# Patient Record
(demographics unavailable — no encounter records)

---

## 2025-01-30 NOTE — PHYSICAL EXAM
[General Appearance - Well Developed] : well developed [Normal Appearance] : normal appearance [Well Groomed] : well groomed [General Appearance - Well Nourished] : well nourished [No Deformities] : no deformities [General Appearance - In No Acute Distress] : no acute distress [Normal Conjunctiva] : the conjunctiva exhibited no abnormalities [Eyelids - No Xanthelasma] : the eyelids demonstrated no xanthelasmas [Normal Oral Mucosa] : normal oral mucosa [No Oral Pallor] : no oral pallor [No Oral Cyanosis] : no oral cyanosis [Normal Jugular Venous A Waves Present] : normal jugular venous A waves present [Normal Jugular Venous V Waves Present] : normal jugular venous V waves present [No Jugular Venous Machado A Waves] : no jugular venous machado A waves [Respiration, Rhythm And Depth] : normal respiratory rhythm and effort [Exaggerated Use Of Accessory Muscles For Inspiration] : no accessory muscle use [Auscultation Breath Sounds / Voice Sounds] : lungs were clear to auscultation bilaterally [Heart Rate And Rhythm] : heart rate and rhythm were normal [Heart Sounds] : normal S1 and S2 [Murmurs] : no murmurs present [Abdomen Soft] : soft [Abdomen Tenderness] : non-tender [Abdomen Mass (___ Cm)] : no abdominal mass palpated [Abnormal Walk] : normal gait [Gait - Sufficient For Exercise Testing] : the gait was sufficient for exercise testing [Nail Clubbing] : no clubbing of the fingernails [Cyanosis, Localized] : no localized cyanosis [Petechial Hemorrhages (___cm)] : no petechial hemorrhages [Skin Color & Pigmentation] : normal skin color and pigmentation [] : no rash [No Venous Stasis] : no venous stasis [Skin Lesions] : no skin lesions [No Skin Ulcers] : no skin ulcer [No Xanthoma] : no  xanthoma was observed [Oriented To Time, Place, And Person] : oriented to person, place, and time [Affect] : the affect was normal [Mood] : the mood was normal [No Anxiety] : not feeling anxious

## 2025-01-30 NOTE — PHYSICAL EXAM
[General Appearance - Well Developed] : well developed [Normal Appearance] : normal appearance [Well Groomed] : well groomed [General Appearance - Well Nourished] : well nourished [No Deformities] : no deformities [General Appearance - In No Acute Distress] : no acute distress [Normal Conjunctiva] : the conjunctiva exhibited no abnormalities [Eyelids - No Xanthelasma] : the eyelids demonstrated no xanthelasmas [Normal Oral Mucosa] : normal oral mucosa [No Oral Pallor] : no oral pallor [No Oral Cyanosis] : no oral cyanosis [Normal Jugular Venous A Waves Present] : normal jugular venous A waves present [Normal Jugular Venous V Waves Present] : normal jugular venous V waves present [No Jugular Venous Machado A Waves] : no jugular venous machado A waves [Respiration, Rhythm And Depth] : normal respiratory rhythm and effort [Exaggerated Use Of Accessory Muscles For Inspiration] : no accessory muscle use [Auscultation Breath Sounds / Voice Sounds] : lungs were clear to auscultation bilaterally [Heart Rate And Rhythm] : heart rate and rhythm were normal [Heart Sounds] : normal S1 and S2 [Murmurs] : no murmurs present [Abdomen Soft] : soft [Abdomen Tenderness] : non-tender [Abdomen Mass (___ Cm)] : no abdominal mass palpated [Abnormal Walk] : normal gait [Gait - Sufficient For Exercise Testing] : the gait was sufficient for exercise testing [Nail Clubbing] : no clubbing of the fingernails [Cyanosis, Localized] : no localized cyanosis [Petechial Hemorrhages (___cm)] : no petechial hemorrhages [Skin Color & Pigmentation] : normal skin color and pigmentation [] : no rash [No Venous Stasis] : no venous stasis [Skin Lesions] : no skin lesions [No Skin Ulcers] : no skin ulcer [No Xanthoma] : no  xanthoma was observed [Oriented To Time, Place, And Person] : oriented to person, place, and time [Mood] : the mood was normal [Affect] : the affect was normal [No Anxiety] : not feeling anxious

## 2025-02-02 NOTE — HISTORY OF PRESENT ILLNESS
[FreeTextEntry1] : 1 1/2 months ago the patient had left foot numbness and headache in the back of her head. She was brought to [facility] emergency room with a due to workup including a CAT scan and MRI and MRA. They found no evidence of a CVA. They found no mass effect. They observed her for 24 hours and they sent her home. He did have an incidental finding of enlarged thyroid. She comes for followup. No new medications were prescribed. 2019: The patient has been experiencing chest and neck tightness for the past week. They come and go all day long. They are unrelated to exercise. They can last seconds to minutes at a time. She sometimes gets some epigastric and substernal discomfort. She denies any outright chest pains, shortness of breath, or palpitations. 2019: The patient has pains in her neck.  She also has pains in her right great toe.  She denies any chest pains, shortness of breath, or palpitations. 2020: Patient has pains in her abdomen that are fleeting and change sides and are intermittent.  She has pains in her infrascapular area.  She has pains in her neck also.  She is under stress because her granddaughter who lives in [place] is sick.  She denies any chest pains, shortness of breath, or palpitations.  She is in need of a routine mammogram.  She has a thyroid nodule which her endocrinologist recommends biopsy but she has not done that yet.  When she went to [facility] radiology they could not find it. 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  She is feeling better but the day after she had it placed she had vertigo and was treated with meclizine.  She denies any chest pains, shortness of breath, or palpitations. 2020: Patient gets occasional neck tightness that last 2 to 5 minutes and is 2 out of 10 in intensity.  It is not related to exercise.  She also gets pains in the back of her neck when she is under stress.  None of these symptoms are exercise related.  She denies any shortness of breath or palpitations. 2021: The patient still gets occasional neck pains but they last just seconds at a time and then not related to exercise.  She denies any chest pains, shortness of breath, or palpitations.  She has a problem with bowel movements and that she has urgency and she does not seem to complete at the first bowel movement.  The second bowel movement slightly loose.  I advised her to follow-up with Dr. Ron, a gastroenterologist.  I also advised her to try Lactaid milk instead of regular milk. May 18, 2021: Patient has no new complaints.  She is taking dicyclomine every other day and it has helped her diarrhea.  When she took it every day she got constipated.  She saw her doctor Asaf about this.  She denies any chest pains, shortness of breath, or palpitations. 2021: The patient has no new complaints.  She denies any palpitations, shortness of breath, or chest pains. 2022: The patient gets occasional tightness or a lump type feeling at the top of her sternum.  It only last for 2 minutes or so and is not related to any specific activity.  She denies any chest pains other than that.  She denies any shortness of breath or palpitations. May 17, 2022: The patient had a brother who  this past month after suffering from colon cancer stage IV for 6 years.  She also had a close cousin  of ovarian cancer.  She denies any chest pains, palpitations, or shortness of breath. 2022: The patient has several scattered intermittent pains including lower back, epigastric, and lower abdominal.  Dr. Ron  is sending her for CAT scan.  She had a brother who  of colon cancer and a cousin who  of ovarian cancer.  She denies any chest pains, shortness of breath, or palpitations. 2023: The patient has no new complaints.  She denies any shortness of breath, palpitations, or chest pains. May 23, 2023: The patient has no new complaints.  She denies any chest pains, shortness of breath, or palpitations.  She does get a tension headache once in a while. 2023 - Patient returns today for follow-up for the first time since Eyad Jimenez MD retired.  2024 Rachele Schilling returns today for scheduled follow up. She is seen today in her usual state of health. She denies any chest discomfort, shortness of breath, palpitations, lightheadedness or syncope. We have reviewed her medications and she is taking all as directed.   She and her  will be celebrating their 64-year wedding anniversary next month, 7/3.   2024 Rachele Schilling returns today for routine scheduled follow up. She is seen today in her usual state of health. At a recent visit with her internist she was found to have a urinary tract infection and is currently on antibiotic therapy. She is not exercising aside from going up and down the stairs in the house. No chest discomfort, shortness of breath, palpitations, lightheadedness or syncope.

## 2025-02-02 NOTE — CARDIOLOGY SUMMARY
[de-identified] : 6/4/2024. SR at 73 BPM. Left axis-anterior fascicular block. Poor R-wave progression. Low voltage in precordial leads.  [de-identified] : 9/3/2019 - 4 minutes and 30 seconds of Nicholas protocol (5 METS), 93% MPHR, normal myocardial perfusion imaging, LVEF 85%, LVEDV 41 mL [de-identified] : 9/3/2019 - dilated LA, normal LV sysotlic function, LVEF 53%

## 2025-02-02 NOTE — CARDIOLOGY SUMMARY
[de-identified] : 6/4/2024. SR at 73 BPM. Left axis-anterior fascicular block. Poor R-wave progression. Low voltage in precordial leads.  [de-identified] : 9/3/2019 - 4 minutes and 30 seconds of Nicholas protocol (5 METS), 93% MPHR, normal myocardial perfusion imaging, LVEF 85%, LVEDV 41 mL [de-identified] : 9/3/2019 - dilated LA, normal LV sysotlic function, LVEF 53%

## 2025-02-02 NOTE — CARDIOLOGY SUMMARY
[de-identified] : 6/4/2024. SR at 73 BPM. Left axis-anterior fascicular block. Poor R-wave progression. Low voltage in precordial leads.  [de-identified] : 9/3/2019 - 4 minutes and 30 seconds of Nicholas protocol (5 METS), 93% MPHR, normal myocardial perfusion imaging, LVEF 85%, LVEDV 41 mL [de-identified] : 9/3/2019 - dilated LA, normal LV sysotlic function, LVEF 53%

## 2025-02-02 NOTE — HISTORY OF PRESENT ILLNESS
[FreeTextEntry1] : 1 1/2 months ago the patient had left foot numbness and headache in the back of her head. She was brought to [facility] emergency room with a due to workup including a CAT scan and MRI and MRA. They found no evidence of a CVA. They found no mass effect. They observed her for 24 hours and they sent her home. He did have an incidental finding of enlarged thyroid. She comes for followup. No new medications were prescribed. 2019: The patient has been experiencing chest and neck tightness for the past week. They come and go all day long. They are unrelated to exercise. They can last seconds to minutes at a time. She sometimes gets some epigastric and substernal discomfort. She denies any outright chest pains, shortness of breath, or palpitations. 2019: The patient has pains in her neck.  She also has pains in her right great toe.  She denies any chest pains, shortness of breath, or palpitations. 2020: Patient has pains in her abdomen that are fleeting and change sides and are intermittent.  She has pains in her infrascapular area.  She has pains in her neck also.  She is under stress because her granddaughter who lives in [place] is sick.  She denies any chest pains, shortness of breath, or palpitations.  She is in need of a routine mammogram.  She has a thyroid nodule which her endocrinologist recommends biopsy but she has not done that yet.  When she went to [facility] radiology they could not find it. 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  She is feeling better but the day after she had it placed she had vertigo and was treated with meclizine.  She denies any chest pains, shortness of breath, or palpitations. 2020: Patient gets occasional neck tightness that last 2 to 5 minutes and is 2 out of 10 in intensity.  It is not related to exercise.  She also gets pains in the back of her neck when she is under stress.  None of these symptoms are exercise related.  She denies any shortness of breath or palpitations. 2021: The patient still gets occasional neck pains but they last just seconds at a time and then not related to exercise.  She denies any chest pains, shortness of breath, or palpitations.  She has a problem with bowel movements and that she has urgency and she does not seem to complete at the first bowel movement.  The second bowel movement slightly loose.  I advised her to follow-up with Dr. Ron, a gastroenterologist.  I also advised her to try Lactaid milk instead of regular milk. May 18, 2021: Patient has no new complaints.  She is taking dicyclomine every other day and it has helped her diarrhea.  When she took it every day she got constipated.  She saw her doctor Asaf about this.  She denies any chest pains, shortness of breath, or palpitations. 2021: The patient has no new complaints.  She denies any palpitations, shortness of breath, or chest pains. 2022: The patient gets occasional tightness or a lump type feeling at the top of her sternum.  It only last for 2 minutes or so and is not related to any specific activity.  She denies any chest pains other than that.  She denies any shortness of breath or palpitations. May 17, 2022: The patient had a brother who  this past month after suffering from colon cancer stage IV for 6 years.  She also had a close cousin  of ovarian cancer.  She denies any chest pains, palpitations, or shortness of breath. 2022: The patient has several scattered intermittent pains including lower back, epigastric, and lower abdominal.  Dr. Ron  is sending her for CAT scan.  She had a brother who  of colon cancer and a cousin who  of ovarian cancer.  She denies any chest pains, shortness of breath, or palpitations. 2023: The patient has no new complaints.  She denies any shortness of breath, palpitations, or chest pains. May 23, 2023: The patient has no new complaints.  She denies any chest pains, shortness of breath, or palpitations.  She does get a tension headache once in a while. 2023 - Patient returns today for follow-up for the first time since Eyad Jimenez MD retired.  2024 Rachele Schilling returns today for scheduled follow up. She is seen today in her usual state of health. She denies any chest discomfort, shortness of breath, palpitations, lightheadedness or syncope. We have reviewed her medications and she is taking all as directed.   She and her  will be celebrating their 64-year wedding anniversary next month, 7/3.   2024 Rachele Schilling returns today for routine scheduled follow up. She is seen today in her usual state of health. At a recent visit with her internist she was found to have a urinary tract infection and is currently on antibiotic therapy. She is not exercising aside from going up and down the stairs in the house. No chest discomfort, shortness of breath, palpitations, lightheadedness or syncope.

## 2025-02-02 NOTE — REVIEW OF SYSTEMS
[Headache] : headache [Diarrhea] : diarrhea [Constipation] : constipation [Negative] : Heme/Lymph [SOB] : no shortness of breath [Dyspnea on exertion] : not dyspnea during exertion [Chest Discomfort] : no chest discomfort [Lower Ext Edema] : no extremity edema [Leg Claudication] : no intermittent leg claudication [Palpitations] : no palpitations [Orthopnea] : no orthopnea [PND] : no PND [Syncope] : no syncope

## 2025-02-02 NOTE — REASON FOR VISIT
[Spouse] : spouse [FreeTextEntry1] : 1/30/2025 Rachele returns today for scheduled follow up. She is seen today in her usual state of health. Due to the cold weather, she has been mostly inside but is still able to complete her food shopping and run errands as needed. She does note an occasional tightness across her chest which occurs at random, but otherwise no chest discomfort, shortness of breath, palpitations, lightheadedness or syncope. She is in the process of transitioning her primary care physician to Dr. Ewing for ease in commute.

## 2025-02-02 NOTE — END OF VISIT
[Time Spent: ___ minutes] : I have spent [unfilled] minutes of time on the encounter which excludes teaching and separately reported services. [FreeTextEntry3] : I, Arnold Jimenez MD, personally performed the evaluation and management (E/M) services for this established patient who presents today with (a) new problem(s)/exacerbation of (an) existing condition(s). That E/M includes conducting the clinically appropriate interval history &/or exam, assessing all new/exacerbated conditions, and establishing a new plan of care. Today, Petrona Raygoza NP was here to observe my evaluation and management service for this new problem/exacerbated condition and follow the plan of care established by me going forward.

## 2025-02-02 NOTE — HISTORY OF PRESENT ILLNESS
[FreeTextEntry1] : 1 1/2 months ago the patient had left foot numbness and headache in the back of her head. She was brought to [facility] emergency room with a due to workup including a CAT scan and MRI and MRA. They found no evidence of a CVA. They found no mass effect. They observed her for 24 hours and they sent her home. He did have an incidental finding of enlarged thyroid. She comes for followup. No new medications were prescribed. 2019: The patient has been experiencing chest and neck tightness for the past week. They come and go all day long. They are unrelated to exercise. They can last seconds to minutes at a time. She sometimes gets some epigastric and substernal discomfort. She denies any outright chest pains, shortness of breath, or palpitations. 2019: The patient has pains in her neck.  She also has pains in her right great toe.  She denies any chest pains, shortness of breath, or palpitations. 2020: Patient has pains in her abdomen that are fleeting and change sides and are intermittent.  She has pains in her infrascapular area.  She has pains in her neck also.  She is under stress because her granddaughter who lives in [place] is sick.  She denies any chest pains, shortness of breath, or palpitations.  She is in need of a routine mammogram.  She has a thyroid nodule which her endocrinologist recommends biopsy but she has not done that yet.  When she went to [facility] radiology they could not find it. 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  She is feeling better but the day after she had it placed she had vertigo and was treated with meclizine.  She denies any chest pains, shortness of breath, or palpitations. 2020: Patient gets occasional neck tightness that last 2 to 5 minutes and is 2 out of 10 in intensity.  It is not related to exercise.  She also gets pains in the back of her neck when she is under stress.  None of these symptoms are exercise related.  She denies any shortness of breath or palpitations. 2021: The patient still gets occasional neck pains but they last just seconds at a time and then not related to exercise.  She denies any chest pains, shortness of breath, or palpitations.  She has a problem with bowel movements and that she has urgency and she does not seem to complete at the first bowel movement.  The second bowel movement slightly loose.  I advised her to follow-up with Dr. Ron, a gastroenterologist.  I also advised her to try Lactaid milk instead of regular milk. May 18, 2021: Patient has no new complaints.  She is taking dicyclomine every other day and it has helped her diarrhea.  When she took it every day she got constipated.  She saw her doctor Asaf about this.  She denies any chest pains, shortness of breath, or palpitations. 2021: The patient has no new complaints.  She denies any palpitations, shortness of breath, or chest pains. 2022: The patient gets occasional tightness or a lump type feeling at the top of her sternum.  It only last for 2 minutes or so and is not related to any specific activity.  She denies any chest pains other than that.  She denies any shortness of breath or palpitations. May 17, 2022: The patient had a brother who  this past month after suffering from colon cancer stage IV for 6 years.  She also had a close cousin  of ovarian cancer.  She denies any chest pains, palpitations, or shortness of breath. 2022: The patient has several scattered intermittent pains including lower back, epigastric, and lower abdominal.  Dr. Ron  is sending her for CAT scan.  She had a brother who  of colon cancer and a cousin who  of ovarian cancer.  She denies any chest pains, shortness of breath, or palpitations. 2023: The patient has no new complaints.  She denies any shortness of breath, palpitations, or chest pains. May 23, 2023: The patient has no new complaints.  She denies any chest pains, shortness of breath, or palpitations.  She does get a tension headache once in a while. 2023 - Patient returns today for follow-up for the first time since Eyad Jimenez MD retired.  2024 Rachele Schilling returns today for scheduled follow up. She is seen today in her usual state of health. She denies any chest discomfort, shortness of breath, palpitations, lightheadedness or syncope. We have reviewed her medications and she is taking all as directed.   She and her  will be celebrating their 64-year wedding anniversary next month, 7/3.   2024 Rachele Schilling returns today for routine scheduled follow up. She is seen today in her usual state of health. At a recent visit with her internist she was found to have a urinary tract infection and is currently on antibiotic therapy. She is not exercising aside from going up and down the stairs in the house. No chest discomfort, shortness of breath, palpitations, lightheadedness or syncope.

## 2025-02-02 NOTE — DISCUSSION/SUMMARY
[EKG obtained to assist in diagnosis and management of assessed problem(s)] : EKG obtained to assist in diagnosis and management of assessed problem(s) [FreeTextEntry1] : The patient was examined. Her blood pressure was 130/68 mm Hg mmHg, and her pulse was 76 bpm. Her neck was supple. Her lungs were clear to auscultation. Cardiac exam was negative for murmurs rubs or gallops The EKG showed normal sinus rhythm with no acute changes.  We will continue the patient's diltiazem at 420 mg. She'll continue on her other medication.  She will schedule an echocardiogram for structural heart evaluation.   She will return in 3 months, or earlier if needed.

## 2025-04-29 NOTE — CARDIOLOGY SUMMARY
[de-identified] : 6/4/2024. SR at 73 BPM. Left axis-anterior fascicular block. Poor R-wave progression. Low voltage in precordial leads.  [de-identified] : 9/3/2019 - 4 minutes and 30 seconds of Nicholas protocol (5 METS), 93% MPHR, normal myocardial perfusion imaging, LVEF 85%, LVEDV 41 mL [de-identified] : 9/3/2019 - dilated LA, normal LV systolic function, LVEF 53%. 2/3/2025- TTE.  1. Left ventricular systolic function is normal with an ejection fraction of 67 % by Paula's method of disks. 2. Left atrium is normal in size. 3. Mild mitral regurgitation at a blood pressure of 130/70 mmHg. 4. Normal right ventricular cavity size and normal right ventricular systolic function. 5. Estimated pulmonary artery systolic pressure is 30 mmHg, consistent with normal pulmonary artery pressure. 6. Compared to the transthoracic echocardiogram performed on 9/3/2019, there have been no significant interval changes.

## 2025-04-29 NOTE — REASON FOR VISIT
[Spouse] : spouse [FreeTextEntry1] : 4/22/2025 Rachele returns today for scheduled follow up. She has been getting frequent pressure type headaches on a near daily basis. Tylenol usually resolves her symptoms. She is planning to see her internist next week including labs.

## 2025-04-29 NOTE — CARDIOLOGY SUMMARY
[de-identified] : 6/4/2024. SR at 73 BPM. Left axis-anterior fascicular block. Poor R-wave progression. Low voltage in precordial leads.  [de-identified] : 9/3/2019 - 4 minutes and 30 seconds of Nicholas protocol (5 METS), 93% MPHR, normal myocardial perfusion imaging, LVEF 85%, LVEDV 41 mL [de-identified] : 9/3/2019 - dilated LA, normal LV systolic function, LVEF 53%. 2/3/2025- TTE.  1. Left ventricular systolic function is normal with an ejection fraction of 67 % by Paula's method of disks. 2. Left atrium is normal in size. 3. Mild mitral regurgitation at a blood pressure of 130/70 mmHg. 4. Normal right ventricular cavity size and normal right ventricular systolic function. 5. Estimated pulmonary artery systolic pressure is 30 mmHg, consistent with normal pulmonary artery pressure. 6. Compared to the transthoracic echocardiogram performed on 9/3/2019, there have been no significant interval changes.

## 2025-04-29 NOTE — HISTORY OF PRESENT ILLNESS
[FreeTextEntry1] : 1 1/2 months ago the patient had left foot numbness and headache in the back of her head. She was brought to [facility] emergency room with a due to workup including a CAT scan and MRI and MRA. They found no evidence of a CVA. They found no mass effect. They observed her for 24 hours and they sent her home. He did have an incidental finding of enlarged thyroid. She comes for followup. No new medications were prescribed. 2019: The patient has been experiencing chest and neck tightness for the past week. They come and go all day long. They are unrelated to exercise. They can last seconds to minutes at a time. She sometimes gets some epigastric and substernal discomfort. She denies any outright chest pains, shortness of breath, or palpitations. 2019: The patient has pains in her neck.  She also has pains in her right great toe.  She denies any chest pains, shortness of breath, or palpitations. 2020: Patient has pains in her abdomen that are fleeting and change sides and are intermittent.  She has pains in her infrascapular area.  She has pains in her neck also.  She is under stress because her granddaughter who lives in [place] is sick.  She denies any chest pains, shortness of breath, or palpitations.  She is in need of a routine mammogram.  She has a thyroid nodule which her endocrinologist recommends biopsy but she has not done that yet.  When she went to [facility] radiology they could not find it. 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  She is feeling better but the day after she had it placed she had vertigo and was treated with meclizine.  She denies any chest pains, shortness of breath, or palpitations. 2020: Patient gets occasional neck tightness that last 2 to 5 minutes and is 2 out of 10 in intensity.  It is not related to exercise.  She also gets pains in the back of her neck when she is under stress.  None of these symptoms are exercise related.  She denies any shortness of breath or palpitations. 2021: The patient still gets occasional neck pains but they last just seconds at a time and then not related to exercise.  She denies any chest pains, shortness of breath, or palpitations.  She has a problem with bowel movements and that she has urgency and she does not seem to complete at the first bowel movement.  The second bowel movement slightly loose.  I advised her to follow-up with Dr. Ron, a gastroenterologist.  I also advised her to try Lactaid milk instead of regular milk. May 18, 2021: Patient has no new complaints.  She is taking dicyclomine every other day and it has helped her diarrhea.  When she took it every day she got constipated.  She saw her doctor Asaf about this.  She denies any chest pains, shortness of breath, or palpitations. 2021: The patient has no new complaints.  She denies any palpitations, shortness of breath, or chest pains. 2022: The patient gets occasional tightness or a lump type feeling at the top of her sternum.  It only last for 2 minutes or so and is not related to any specific activity.  She denies any chest pains other than that.  She denies any shortness of breath or palpitations. May 17, 2022: The patient had a brother who  this past month after suffering from colon cancer stage IV for 6 years.  She also had a close cousin  of ovarian cancer.  She denies any chest pains, palpitations, or shortness of breath. 2022: The patient has several scattered intermittent pains including lower back, epigastric, and lower abdominal.  Dr. Ron  is sending her for CAT scan.  She had a brother who  of colon cancer and a cousin who  of ovarian cancer.  She denies any chest pains, shortness of breath, or palpitations. 2023: The patient has no new complaints.  She denies any shortness of breath, palpitations, or chest pains. May 23, 2023: The patient has no new complaints.  She denies any chest pains, shortness of breath, or palpitations.  She does get a tension headache once in a while. 2023 - Patient returns today for follow-up for the first time since Eyad Jimenez MD retired.  2024 Rachele Schilling returns today for scheduled follow up. She is seen today in her usual state of health. She denies any chest discomfort, shortness of breath, palpitations, lightheadedness or syncope. We have reviewed her medications and she is taking all as directed.   She and her  will be celebrating their 64-year wedding anniversary next month, 7/3.   2024 Rachele Schilling returns today for routine scheduled follow up. She is seen today in her usual state of health. At a recent visit with her internist she was found to have a urinary tract infection and is currently on antibiotic therapy. She is not exercising aside from going up and down the stairs in the house. No chest discomfort, shortness of breath, palpitations, lightheadedness or syncope.  2025 Rachele returns today for scheduled follow up. She is seen today in her usual state of health. Due to the cold weather, she has been mostly inside but is still able to complete her food shopping and run errands as needed. She does note an occasional tightness across her chest which occurs at random, but otherwise no chest discomfort, shortness of breath, palpitations, lightheadedness or syncope. She is in the process of transitioning her primary care physician to Dr. Ewing for ease in commute.

## 2025-04-29 NOTE — HISTORY OF PRESENT ILLNESS
[FreeTextEntry1] : 1 1/2 months ago the patient had left foot numbness and headache in the back of her head. She was brought to [facility] emergency room with a due to workup including a CAT scan and MRI and MRA. They found no evidence of a CVA. They found no mass effect. They observed her for 24 hours and they sent her home. He did have an incidental finding of enlarged thyroid. She comes for followup. No new medications were prescribed. 2019: The patient has been experiencing chest and neck tightness for the past week. They come and go all day long. They are unrelated to exercise. They can last seconds to minutes at a time. She sometimes gets some epigastric and substernal discomfort. She denies any outright chest pains, shortness of breath, or palpitations. 2019: The patient has pains in her neck.  She also has pains in her right great toe.  She denies any chest pains, shortness of breath, or palpitations. 2020: Patient has pains in her abdomen that are fleeting and change sides and are intermittent.  She has pains in her infrascapular area.  She has pains in her neck also.  She is under stress because her granddaughter who lives in [place] is sick.  She denies any chest pains, shortness of breath, or palpitations.  She is in need of a routine mammogram.  She has a thyroid nodule which her endocrinologist recommends biopsy but she has not done that yet.  When she went to [facility] radiology they could not find it. 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  She is feeling better but the day after she had it placed she had vertigo and was treated with meclizine.  She denies any chest pains, shortness of breath, or palpitations. 2020: Patient gets occasional neck tightness that last 2 to 5 minutes and is 2 out of 10 in intensity.  It is not related to exercise.  She also gets pains in the back of her neck when she is under stress.  None of these symptoms are exercise related.  She denies any shortness of breath or palpitations. 2021: The patient still gets occasional neck pains but they last just seconds at a time and then not related to exercise.  She denies any chest pains, shortness of breath, or palpitations.  She has a problem with bowel movements and that she has urgency and she does not seem to complete at the first bowel movement.  The second bowel movement slightly loose.  I advised her to follow-up with Dr. Ron, a gastroenterologist.  I also advised her to try Lactaid milk instead of regular milk. May 18, 2021: Patient has no new complaints.  She is taking dicyclomine every other day and it has helped her diarrhea.  When she took it every day she got constipated.  She saw her doctor Asaf about this.  She denies any chest pains, shortness of breath, or palpitations. 2021: The patient has no new complaints.  She denies any palpitations, shortness of breath, or chest pains. 2022: The patient gets occasional tightness or a lump type feeling at the top of her sternum.  It only last for 2 minutes or so and is not related to any specific activity.  She denies any chest pains other than that.  She denies any shortness of breath or palpitations. May 17, 2022: The patient had a brother who  this past month after suffering from colon cancer stage IV for 6 years.  She also had a close cousin  of ovarian cancer.  She denies any chest pains, palpitations, or shortness of breath. 2022: The patient has several scattered intermittent pains including lower back, epigastric, and lower abdominal.  Dr. Rno  is sending her for CAT scan.  She had a brother who  of colon cancer and a cousin who  of ovarian cancer.  She denies any chest pains, shortness of breath, or palpitations. 2023: The patient has no new complaints.  She denies any shortness of breath, palpitations, or chest pains. May 23, 2023: The patient has no new complaints.  She denies any chest pains, shortness of breath, or palpitations.  She does get a tension headache once in a while. 2023 - Patient returns today for follow-up for the first time since Eyad Jimenez MD retired.  2024 Rachele Schilling returns today for scheduled follow up. She is seen today in her usual state of health. She denies any chest discomfort, shortness of breath, palpitations, lightheadedness or syncope. We have reviewed her medications and she is taking all as directed.   She and her  will be celebrating their 64-year wedding anniversary next month, 7/3.   2024 Rachele Schilling returns today for routine scheduled follow up. She is seen today in her usual state of health. At a recent visit with her internist she was found to have a urinary tract infection and is currently on antibiotic therapy. She is not exercising aside from going up and down the stairs in the house. No chest discomfort, shortness of breath, palpitations, lightheadedness or syncope.  2025 Rachele returns today for scheduled follow up. She is seen today in her usual state of health. Due to the cold weather, she has been mostly inside but is still able to complete her food shopping and run errands as needed. She does note an occasional tightness across her chest which occurs at random, but otherwise no chest discomfort, shortness of breath, palpitations, lightheadedness or syncope. She is in the process of transitioning her primary care physician to Dr. Ewing for ease in commute.

## 2025-04-29 NOTE — DISCUSSION/SUMMARY
[EKG obtained to assist in diagnosis and management of assessed problem(s)] : EKG obtained to assist in diagnosis and management of assessed problem(s) [FreeTextEntry1] : The patient was examined. Her blood pressure was 138/70 mm Hg mmHg, and her pulse was 64 bpm. Her neck was supple. Her lungs were clear to auscultation. Cardiac exam was negative for murmurs rubs or gallops The EKG showed normal sinus rhythm with no acute changes.  We will continue the patient's diltiazem at 420 mg. She'll continue on her other medication.  She will schedule an echocardiogram for structural heart evaluation.   She will return in 3 months, or earlier if needed.

## 2025-07-27 NOTE — DISCUSSION/SUMMARY
[EKG obtained to assist in diagnosis and management of assessed problem(s)] : EKG obtained to assist in diagnosis and management of assessed problem(s) [FreeTextEntry1] : The patient was examined. Her blood pressure was 124/58 mm Hg mmHg, and her pulse was 58 bpm. Her neck was supple. Her lungs were clear to auscultation. Cardiac exam was negative for murmurs rubs or gallops The EKG showed normal sinus rhythm with no acute changes.  We will continue the patient's diltiazem at 420 mg. She'll continue on her other medication.  Recent echocardiogram as above.   She will return in 3 months, or earlier if needed.

## 2025-07-27 NOTE — HISTORY OF PRESENT ILLNESS
[FreeTextEntry1] : 1 1/2 months ago the patient had left foot numbness and headache in the back of her head. She was brought to [facility] emergency room with a due to workup including a CAT scan and MRI and MRA. They found no evidence of a CVA. They found no mass effect. They observed her for 24 hours and they sent her home. He did have an incidental finding of enlarged thyroid. She comes for followup. No new medications were prescribed. 2019: The patient has been experiencing chest and neck tightness for the past week. They come and go all day long. They are unrelated to exercise. They can last seconds to minutes at a time. She sometimes gets some epigastric and substernal discomfort. She denies any outright chest pains, shortness of breath, or palpitations. 2019: The patient has pains in her neck.  She also has pains in her right great toe.  She denies any chest pains, shortness of breath, or palpitations. 2020: Patient has pains in her abdomen that are fleeting and change sides and are intermittent.  She has pains in her infrascapular area.  She has pains in her neck also.  She is under stress because her granddaughter who lives in [place] is sick.  She denies any chest pains, shortness of breath, or palpitations.  She is in need of a routine mammogram.  She has a thyroid nodule which her endocrinologist recommends biopsy but she has not done that yet.  When she went to [facility] radiology they could not find it. 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  She is feeling better but the day after she had it placed she had vertigo and was treated with meclizine.  She denies any chest pains, shortness of breath, or palpitations. 2020: Patient gets occasional neck tightness that last 2 to 5 minutes and is 2 out of 10 in intensity.  It is not related to exercise.  She also gets pains in the back of her neck when she is under stress.  None of these symptoms are exercise related.  She denies any shortness of breath or palpitations. 2021: The patient still gets occasional neck pains but they last just seconds at a time and then not related to exercise.  She denies any chest pains, shortness of breath, or palpitations.  She has a problem with bowel movements and that she has urgency and she does not seem to complete at the first bowel movement.  The second bowel movement slightly loose.  I advised her to follow-up with Dr. Ron, a gastroenterologist.  I also advised her to try Lactaid milk instead of regular milk. May 18, 2021: Patient has no new complaints.  She is taking dicyclomine every other day and it has helped her diarrhea.  When she took it every day she got constipated.  She saw her doctor Asaf about this.  She denies any chest pains, shortness of breath, or palpitations. 2021: The patient has no new complaints.  She denies any palpitations, shortness of breath, or chest pains. 2022: The patient gets occasional tightness or a lump type feeling at the top of her sternum.  It only last for 2 minutes or so and is not related to any specific activity.  She denies any chest pains other than that.  She denies any shortness of breath or palpitations. May 17, 2022: The patient had a brother who  this past month after suffering from colon cancer stage IV for 6 years.  She also had a close cousin  of ovarian cancer.  She denies any chest pains, palpitations, or shortness of breath. 2022: The patient has several scattered intermittent pains including lower back, epigastric, and lower abdominal.  Dr. Ron  is sending her for CAT scan.  She had a brother who  of colon cancer and a cousin who  of ovarian cancer.  She denies any chest pains, shortness of breath, or palpitations. 2023: The patient has no new complaints.  She denies any shortness of breath, palpitations, or chest pains. May 23, 2023: The patient has no new complaints.  She denies any chest pains, shortness of breath, or palpitations.  She does get a tension headache once in a while. 2023 - Patient returns today for follow-up for the first time since Eyad Jimenez MD retired.  2024 Rachele Schilling returns today for scheduled follow up. She is seen today in her usual state of health. She denies any chest discomfort, shortness of breath, palpitations, lightheadedness or syncope. We have reviewed her medications and she is taking all as directed.   She and her  will be celebrating their 64-year wedding anniversary next month, 7/3.   2024 Rachele Schilling returns today for routine scheduled follow up. She is seen today in her usual state of health. At a recent visit with her internist she was found to have a urinary tract infection and is currently on antibiotic therapy. She is not exercising aside from going up and down the stairs in the house. No chest discomfort, shortness of breath, palpitations, lightheadedness or syncope.  2025 Rachele returns today for scheduled follow up. She is seen today in her usual state of health. Due to the cold weather, she has been mostly inside but is still able to complete her food shopping and run errands as needed. She does note an occasional tightness across her chest which occurs at random, but otherwise no chest discomfort, shortness of breath, palpitations, lightheadedness or syncope. She is in the process of transitioning her primary care physician to Dr. Ewing for ease in commute.   2025 Rachele returns today for scheduled follow up. She has been getting frequent pressure type headaches on a near daily basis. Tylenol usually resolves her symptoms. She is planning to see her internist next week including labs.

## 2025-07-27 NOTE — REASON FOR VISIT
[Spouse] : spouse [FreeTextEntry1] : 7/22/2025 Rachele returns today for scheduled follow up. She is in the process of transitioning her care from Darron Ron MD to Tommy Ewing MD to be closer to home. She has been feeling well overall and has no specific complaints. She denies any chest discomfort, shortness of breath, palpitations, lightheadedness or syncope. She continues to take her medications as directed.

## 2025-07-27 NOTE — CARDIOLOGY SUMMARY
[de-identified] : 6/4/2024. SR at 73 BPM. Left axis-anterior fascicular block. Poor R-wave progression. Low voltage in precordial leads.  [de-identified] : 9/3/2019 - 4 minutes and 30 seconds of Nicholas protocol (5 METS), 93% MPHR, normal myocardial perfusion imaging, LVEF 85%, LVEDV 41 mL [de-identified] : 9/3/2019 - dilated LA, normal LV systolic function, LVEF 53%. 2/3/2025- TTE.  1. Left ventricular systolic function is normal with an ejection fraction of 67 % by Paula's method of disks. 2. Left atrium is normal in size. 3. Mild mitral regurgitation at a blood pressure of 130/70 mmHg. 4. Normal right ventricular cavity size and normal right ventricular systolic function. 5. Estimated pulmonary artery systolic pressure is 30 mmHg, consistent with normal pulmonary artery pressure. 6. Compared to the transthoracic echocardiogram performed on 9/3/2019, there have been no significant interval changes.

## 2025-07-27 NOTE — CARDIOLOGY SUMMARY
[de-identified] : 6/4/2024. SR at 73 BPM. Left axis-anterior fascicular block. Poor R-wave progression. Low voltage in precordial leads.  [de-identified] : 9/3/2019 - 4 minutes and 30 seconds of Nicholas protocol (5 METS), 93% MPHR, normal myocardial perfusion imaging, LVEF 85%, LVEDV 41 mL [de-identified] : 9/3/2019 - dilated LA, normal LV systolic function, LVEF 53%. 2/3/2025- TTE.  1. Left ventricular systolic function is normal with an ejection fraction of 67 % by Paula's method of disks. 2. Left atrium is normal in size. 3. Mild mitral regurgitation at a blood pressure of 130/70 mmHg. 4. Normal right ventricular cavity size and normal right ventricular systolic function. 5. Estimated pulmonary artery systolic pressure is 30 mmHg, consistent with normal pulmonary artery pressure. 6. Compared to the transthoracic echocardiogram performed on 9/3/2019, there have been no significant interval changes.

## 2025-07-27 NOTE — HISTORY OF PRESENT ILLNESS
[FreeTextEntry1] : 1 1/2 months ago the patient had left foot numbness and headache in the back of her head. She was brought to [facility] emergency room with a due to workup including a CAT scan and MRI and MRA. They found no evidence of a CVA. They found no mass effect. They observed her for 24 hours and they sent her home. He did have an incidental finding of enlarged thyroid. She comes for followup. No new medications were prescribed. 2019: The patient has been experiencing chest and neck tightness for the past week. They come and go all day long. They are unrelated to exercise. They can last seconds to minutes at a time. She sometimes gets some epigastric and substernal discomfort. She denies any outright chest pains, shortness of breath, or palpitations. 2019: The patient has pains in her neck.  She also has pains in her right great toe.  She denies any chest pains, shortness of breath, or palpitations. 2020: Patient has pains in her abdomen that are fleeting and change sides and are intermittent.  She has pains in her infrascapular area.  She has pains in her neck also.  She is under stress because her granddaughter who lives in [place] is sick.  She denies any chest pains, shortness of breath, or palpitations.  She is in need of a routine mammogram.  She has a thyroid nodule which her endocrinologist recommends biopsy but she has not done that yet.  When she went to [facility] radiology they could not find it. 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  She is feeling better but the day after she had it placed she had vertigo and was treated with meclizine.  She denies any chest pains, shortness of breath, or palpitations. 2020: Patient gets occasional neck tightness that last 2 to 5 minutes and is 2 out of 10 in intensity.  It is not related to exercise.  She also gets pains in the back of her neck when she is under stress.  None of these symptoms are exercise related.  She denies any shortness of breath or palpitations. 2021: The patient still gets occasional neck pains but they last just seconds at a time and then not related to exercise.  She denies any chest pains, shortness of breath, or palpitations.  She has a problem with bowel movements and that she has urgency and she does not seem to complete at the first bowel movement.  The second bowel movement slightly loose.  I advised her to follow-up with Dr. Ron, a gastroenterologist.  I also advised her to try Lactaid milk instead of regular milk. May 18, 2021: Patient has no new complaints.  She is taking dicyclomine every other day and it has helped her diarrhea.  When she took it every day she got constipated.  She saw her doctor Asaf about this.  She denies any chest pains, shortness of breath, or palpitations. 2021: The patient has no new complaints.  She denies any palpitations, shortness of breath, or chest pains. 2022: The patient gets occasional tightness or a lump type feeling at the top of her sternum.  It only last for 2 minutes or so and is not related to any specific activity.  She denies any chest pains other than that.  She denies any shortness of breath or palpitations. May 17, 2022: The patient had a brother who  this past month after suffering from colon cancer stage IV for 6 years.  She also had a close cousin  of ovarian cancer.  She denies any chest pains, palpitations, or shortness of breath. 2022: The patient has several scattered intermittent pains including lower back, epigastric, and lower abdominal.  Dr. Ron  is sending her for CAT scan.  She had a brother who  of colon cancer and a cousin who  of ovarian cancer.  She denies any chest pains, shortness of breath, or palpitations. 2023: The patient has no new complaints.  She denies any shortness of breath, palpitations, or chest pains. May 23, 2023: The patient has no new complaints.  She denies any chest pains, shortness of breath, or palpitations.  She does get a tension headache once in a while. 2023 - Patient returns today for follow-up for the first time since Eyad Jimenez MD retired.  2024 Rachele Schilling returns today for scheduled follow up. She is seen today in her usual state of health. She denies any chest discomfort, shortness of breath, palpitations, lightheadedness or syncope. We have reviewed her medications and she is taking all as directed.   She and her  will be celebrating their 64-year wedding anniversary next month, 7/3.   2024 Rachele Schilling returns today for routine scheduled follow up. She is seen today in her usual state of health. At a recent visit with her internist she was found to have a urinary tract infection and is currently on antibiotic therapy. She is not exercising aside from going up and down the stairs in the house. No chest discomfort, shortness of breath, palpitations, lightheadedness or syncope.  2025 Rachele returns today for scheduled follow up. She is seen today in her usual state of health. Due to the cold weather, she has been mostly inside but is still able to complete her food shopping and run errands as needed. She does note an occasional tightness across her chest which occurs at random, but otherwise no chest discomfort, shortness of breath, palpitations, lightheadedness or syncope. She is in the process of transitioning her primary care physician to Dr. Ewing for ease in commute.   2025 Rachele returns today for scheduled follow up. She has been getting frequent pressure type headaches on a near daily basis. Tylenol usually resolves her symptoms. She is planning to see her internist next week including labs.

## 2025-07-27 NOTE — END OF VISIT
[Time Spent: ___ minutes] : I have spent [unfilled] minutes of time on the encounter which excludes teaching and separately reported services. [FreeTextEntry3] : I, Arnold Jimenez MD, personally performed the evaluation and management (E/M) services for this established patient who presents today with (a) new problem(s)/exacerbation of (an) existing condition(s). That E/M includes conducting the clinically appropriate interval history &/or exam, assessing all new/exacerbated conditions, and establishing a new plan of care. Today, Petrona aRygoza NP was here to observe my evaluation and management service for this new problem/exacerbated condition and follow the plan of care established by me going forward.